# Patient Record
Sex: FEMALE | Race: WHITE | NOT HISPANIC OR LATINO | Employment: OTHER | URBAN - METROPOLITAN AREA
[De-identification: names, ages, dates, MRNs, and addresses within clinical notes are randomized per-mention and may not be internally consistent; named-entity substitution may affect disease eponyms.]

---

## 2018-07-12 ENCOUNTER — HOSPITAL ENCOUNTER (OUTPATIENT)
Dept: MAMMOGRAPHY | Facility: CLINIC | Age: 68
Discharge: HOME | End: 2018-07-12
Attending: FAMILY MEDICINE | Admitting: FAMILY MEDICINE
Payer: MEDICARE

## 2018-07-12 DIAGNOSIS — Z12.31 VISIT FOR SCREENING MAMMOGRAM: ICD-10-CM

## 2018-07-12 PROCEDURE — 77063 BREAST TOMOSYNTHESIS BI: CPT

## 2019-08-23 ENCOUNTER — HOSPITAL ENCOUNTER (OUTPATIENT)
Dept: MAMMOGRAPHY | Facility: CLINIC | Age: 69
Discharge: HOME | End: 2019-08-23
Attending: FAMILY MEDICINE | Admitting: FAMILY MEDICINE
Payer: MEDICARE

## 2019-08-23 ENCOUNTER — HOSPITAL ENCOUNTER (OUTPATIENT)
Dept: MAMMOGRAPHY | Facility: CLINIC | Age: 69
End: 2019-08-23
Attending: FAMILY MEDICINE
Payer: MEDICARE

## 2019-08-23 DIAGNOSIS — N63.21 LUMP IN UPPER OUTER QUADRANT OF LEFT BREAST: ICD-10-CM

## 2019-08-23 DIAGNOSIS — N63.20 LEFT BREAST LUMP: ICD-10-CM

## 2019-08-23 PROCEDURE — 76642 ULTRASOUND BREAST LIMITED: CPT | Mod: LT

## 2019-08-23 PROCEDURE — G0279 TOMOSYNTHESIS, MAMMO: HCPCS

## 2019-08-23 PROCEDURE — 77066 DX MAMMO INCL CAD BI: CPT

## 2020-09-10 ENCOUNTER — HOSPITAL ENCOUNTER (OUTPATIENT)
Dept: MAMMOGRAPHY | Facility: CLINIC | Age: 70
Discharge: HOME | End: 2020-09-10
Attending: FAMILY MEDICINE | Admitting: FAMILY MEDICINE
Payer: MEDICARE

## 2020-09-10 DIAGNOSIS — Z12.31 VISIT FOR SCREENING MAMMOGRAM: ICD-10-CM

## 2020-09-10 PROCEDURE — 77067 SCR MAMMO BI INCL CAD: CPT

## 2021-09-28 ENCOUNTER — HOSPITAL ENCOUNTER (OUTPATIENT)
Dept: MAMMOGRAPHY | Facility: CLINIC | Age: 71
Discharge: HOME | End: 2021-09-28
Attending: FAMILY MEDICINE | Admitting: FAMILY MEDICINE
Payer: MEDICARE

## 2021-09-28 DIAGNOSIS — Z12.31 VISIT FOR SCREENING MAMMOGRAM: ICD-10-CM

## 2021-09-28 PROCEDURE — 77063 BREAST TOMOSYNTHESIS BI: CPT

## 2021-10-31 ENCOUNTER — HEALTH MAINTENANCE LETTER (OUTPATIENT)
Age: 71
End: 2021-10-31

## 2022-10-13 ENCOUNTER — HOSPITAL ENCOUNTER (OUTPATIENT)
Dept: MAMMOGRAPHY | Facility: CLINIC | Age: 72
Discharge: HOME | End: 2022-10-13
Attending: FAMILY MEDICINE | Admitting: FAMILY MEDICINE
Payer: MEDICARE

## 2022-10-13 DIAGNOSIS — Z12.31 VISIT FOR SCREENING MAMMOGRAM: ICD-10-CM

## 2022-10-13 PROCEDURE — 77067 SCR MAMMO BI INCL CAD: CPT

## 2022-10-23 ENCOUNTER — HEALTH MAINTENANCE LETTER (OUTPATIENT)
Age: 72
End: 2022-10-23

## 2022-11-15 ENCOUNTER — ANCILLARY PROCEDURE (OUTPATIENT)
Dept: MRI IMAGING | Facility: CLINIC | Age: 72
End: 2022-11-15
Attending: FAMILY MEDICINE
Payer: MEDICARE

## 2022-11-15 DIAGNOSIS — K76.9 LIVER LESION: ICD-10-CM

## 2022-11-15 PROCEDURE — 255N000002 HC RX 255 OP 636: Performed by: FAMILY MEDICINE

## 2022-11-15 PROCEDURE — 74183 MRI ABD W/O CNTR FLWD CNTR: CPT

## 2022-11-15 PROCEDURE — A9585 GADOBUTROL INJECTION: HCPCS | Performed by: FAMILY MEDICINE

## 2022-11-15 RX ORDER — GADOBUTROL 604.72 MG/ML
7 INJECTION INTRAVENOUS ONCE
Status: COMPLETED | OUTPATIENT
Start: 2022-11-15 | End: 2022-11-15

## 2022-11-15 RX ADMIN — GADOBUTROL 7 ML: 604.72 INJECTION INTRAVENOUS at 11:15

## 2023-01-25 ENCOUNTER — ANCILLARY PROCEDURE (OUTPATIENT)
Dept: ULTRASOUND IMAGING | Facility: CLINIC | Age: 73
End: 2023-01-25
Attending: FAMILY MEDICINE
Payer: MEDICARE

## 2023-01-25 DIAGNOSIS — N94.89 ADNEXAL MASS: ICD-10-CM

## 2023-01-25 PROCEDURE — 76830 TRANSVAGINAL US NON-OB: CPT

## 2023-02-22 ENCOUNTER — HOSPITAL ENCOUNTER (EMERGENCY)
Facility: CLINIC | Age: 73
Discharge: HOME | End: 2023-02-22
Attending: EMERGENCY MEDICINE | Admitting: EMERGENCY MEDICINE
Payer: MEDICARE

## 2023-02-22 ENCOUNTER — APPOINTMENT (OUTPATIENT)
Dept: CT IMAGING | Facility: CLINIC | Age: 73
End: 2023-02-22
Payer: MEDICARE

## 2023-02-22 VITALS
BODY MASS INDEX: 26.58 KG/M2 | HEART RATE: 80 BPM | RESPIRATION RATE: 14 BRPM | OXYGEN SATURATION: 97 % | DIASTOLIC BLOOD PRESSURE: 78 MMHG | HEIGHT: 63 IN | TEMPERATURE: 98.9 F | SYSTOLIC BLOOD PRESSURE: 142 MMHG | WEIGHT: 150 LBS

## 2023-02-22 DIAGNOSIS — R10.9 FLANK PAIN: ICD-10-CM

## 2023-02-22 DIAGNOSIS — N20.1 URETERAL STONE: ICD-10-CM

## 2023-02-22 DIAGNOSIS — D36.9 DERMOID CYST: ICD-10-CM

## 2023-02-22 LAB
ALBUMIN UR-MCNC: 50 MG/DL
ANION GAP SERPL CALCULATED.3IONS-SCNC: 11 MMOL/L (ref 7–15)
APPEARANCE UR: ABNORMAL
BILIRUB UR QL STRIP: NEGATIVE
BUN SERPL-MCNC: 23.2 MG/DL (ref 8–23)
CALCIUM SERPL-MCNC: 10.1 MG/DL (ref 8.8–10.2)
CHLORIDE SERPL-SCNC: 102 MMOL/L (ref 98–107)
COLOR UR AUTO: YELLOW
CREAT SERPL-MCNC: 0.94 MG/DL (ref 0.51–0.95)
DEPRECATED HCO3 PLAS-SCNC: 24 MMOL/L (ref 22–29)
ERYTHROCYTE [DISTWIDTH] IN BLOOD BY AUTOMATED COUNT: 12.5 % (ref 10–15)
GFR SERPL CREATININE-BSD FRML MDRD: 65 ML/MIN/1.73M2
GLUCOSE SERPL-MCNC: 159 MG/DL (ref 70–99)
GLUCOSE UR STRIP-MCNC: 30 MG/DL
HCT VFR BLD AUTO: 41.2 % (ref 35–47)
HGB BLD-MCNC: 13.8 G/DL (ref 11.7–15.7)
HGB UR QL STRIP: ABNORMAL
KETONES UR STRIP-MCNC: ABNORMAL MG/DL
LEUKOCYTE ESTERASE UR QL STRIP: NEGATIVE
MCH RBC QN AUTO: 30.7 PG (ref 26.5–33)
MCHC RBC AUTO-ENTMCNC: 33.5 G/DL (ref 31.5–36.5)
MCV RBC AUTO: 92 FL (ref 78–100)
MUCOUS THREADS #/AREA URNS LPF: PRESENT /LPF
NITRATE UR QL: NEGATIVE
PH UR STRIP: 5.5 [PH] (ref 5–7)
PLATELET # BLD AUTO: 384 10E3/UL (ref 150–450)
POTASSIUM SERPL-SCNC: 4.8 MMOL/L (ref 3.4–5.3)
RADIOLOGIST FLAGS: ABNORMAL
RBC # BLD AUTO: 4.49 10E6/UL (ref 3.8–5.2)
RBC URINE: >182 /HPF
SODIUM SERPL-SCNC: 137 MMOL/L (ref 136–145)
SP GR UR STRIP: 1.03 (ref 1–1.03)
UROBILINOGEN UR STRIP-MCNC: NORMAL MG/DL
WBC # BLD AUTO: 12.7 10E3/UL (ref 4–11)
WBC URINE: 26 /HPF

## 2023-02-22 PROCEDURE — 81001 URINALYSIS AUTO W/SCOPE: CPT | Performed by: EMERGENCY MEDICINE

## 2023-02-22 PROCEDURE — 36415 COLL VENOUS BLD VENIPUNCTURE: CPT | Performed by: EMERGENCY MEDICINE

## 2023-02-22 PROCEDURE — 85027 COMPLETE CBC AUTOMATED: CPT | Performed by: EMERGENCY MEDICINE

## 2023-02-22 PROCEDURE — 87086 URINE CULTURE/COLONY COUNT: CPT | Performed by: EMERGENCY MEDICINE

## 2023-02-22 PROCEDURE — 74176 CT ABD & PELVIS W/O CONTRAST: CPT | Mod: MG

## 2023-02-22 PROCEDURE — 99284 EMERGENCY DEPT VISIT MOD MDM: CPT | Mod: 25

## 2023-02-22 PROCEDURE — 82947 ASSAY GLUCOSE BLOOD QUANT: CPT | Performed by: EMERGENCY MEDICINE

## 2023-02-22 RX ORDER — TAMSULOSIN HYDROCHLORIDE 0.4 MG/1
0.4 CAPSULE ORAL DAILY
Qty: 7 CAPSULE | Refills: 0 | Status: SHIPPED | OUTPATIENT
Start: 2023-02-22 | End: 2023-03-01

## 2023-02-22 RX ORDER — OXYCODONE HYDROCHLORIDE 5 MG/1
2.5-5 TABLET ORAL EVERY 6 HOURS PRN
Qty: 8 TABLET | Refills: 0 | Status: SHIPPED | OUTPATIENT
Start: 2023-02-22 | End: 2023-02-25

## 2023-02-22 RX ORDER — ONDANSETRON 4 MG/1
4 TABLET, ORALLY DISINTEGRATING ORAL EVERY 8 HOURS PRN
Qty: 10 TABLET | Refills: 0 | Status: SHIPPED | OUTPATIENT
Start: 2023-02-22

## 2023-02-22 ASSESSMENT — ACTIVITIES OF DAILY LIVING (ADL): ADLS_ACUITY_SCORE: 35

## 2023-02-22 NOTE — ED TRIAGE NOTES
Patient reports history of kidney stones. She states this morning she developed right sided flank pain that has been episodic in nature. Ibuprofen is helping her pain. She reports taking 600mg at 7am and 200mg at 10am.

## 2023-02-22 NOTE — ED PROVIDER NOTES
"  History     Chief Complaint:  Flank Pain       HPI   Schuyler Carl is a 72 year old female with a history of hypertension, hyperlipidemia, and nephrolithiasis who presents with right flank pain. Schuyler states that she has a known right kidney stone as of September 2022. Yesterday night, she started to experience some hematuria, then at 0500 she woke up with some right flank pain. The pain became sharp, so she took Advil and was able to manage it for awhile, but did take a second Advil before presenting to the ED. During evaluation, Schuyler states that her pain is more of a dull ache. She did have some associated nausea, but this has resolved as her pain has gone down. Schuyler otherwise denies any fever, diarrhea, emesis, dysuria, or rash.       Independent Historian:   None - Patient Only    Review of External Notes: I reviewed the patient's imaging results from 9/2022 showing a right kidney stone while in the room with the patient.      ROS:  Review of Systems   All other systems reviewed and are negative.      Allergies:  Lisinopril     Medications:    Atorvastatin  Losartan  Zofran  Lisinopril    Past Medical History:    Nephrolithiasis  Hypertension  Hyperlipidemia  Thyroid cyst    Past Surgical History:    Tonsillectomy     Social History:  Patient is accompanied in the ED by her spouse.  PCP: Madison Perez     Physical Exam     Patient Vitals for the past 24 hrs:   BP Temp Temp src Pulse Resp SpO2 Height Weight   02/22/23 1121 -- -- -- -- -- -- 1.6 m (5' 3\") 68 kg (150 lb)   02/22/23 1112 (!) 163/100 98.9  F (37.2  C) Temporal 90 14 96 % -- --        Physical Exam  General: Resting on the bed.  Head: No obvious trauma to head.  Ears, Nose, Throat:  External ears normal.  Nose normal.   Eyes:  Conjunctivae clear.   CV: Regular rate and rhythm.  No murmurs.      Respiratory: Effort normal and breath sounds normal.  No wheezing or crackles.   Gastrointestinal: Soft.  No distension. There is mild RLQ " tenderness.  There is no rigidity, no rebound and no guarding.   Musculoskeletal: No cva tenderness   Neuro: Alert. Moving all extremities appropriately.  Normal speech.    Skin: Skin is warm and dry.  No rash noted.     Emergency Department Course     Imaging:  Abd/pelvis CT no contrast - Stone Protocol   Final Result   Abnormal   IMPRESSION:    1.  Obstructing 5 mm stone in the proximal right ureter with moderate   proximal hydroureteronephrosis.   2.  Multiple nonobstructing left renal calculi.   3.  Grossly stable size of likely right ovarian dermoid. Recommend   follow-up pelvic ultrasound in 6 months if not removed.   4.  Stable size of 2.9 cm right adrenal adenoma. Consider biochemical   workup and follow-up noncontrast MRI in one year to document size   stability.      [Access Center: Obstructing ureteral stone]      This report will be copied to the [facility] to ensure a   provider acknowledges the finding. Access Center is available Monday   through Friday 8am-3:30 pm.       SLAVA FONTANEZ MD            SYSTEM ID:  [id number]         Report per radiology    Laboratory:  Labs Ordered and Resulted from Time of ED Arrival to Time of ED Departure   CBC WITH PLATELETS - Abnormal       Result Value    WBC Count 12.7 (*)     RBC Count 4.49      Hemoglobin 13.8      Hematocrit 41.2      MCV 92      MCH 30.7      MCHC 33.5      RDW 12.5      Platelet Count 384     BASIC METABOLIC PANEL - Abnormal    Sodium 137      Potassium 4.8      Chloride 102      Carbon Dioxide (CO2) 24      Anion Gap 11      Urea Nitrogen 23.2 (*)     Creatinine 0.94      Calcium 10.1      Glucose 159 (*)     GFR Estimate 65     ROUTINE UA WITH MICROSCOPIC REFLEX TO CULTURE - Abnormal    Color Urine Yellow      Appearance Urine Slightly Cloudy (*)     Glucose Urine 30 (*)     Bilirubin Urine Negative      Ketones Urine Trace (*)     Specific Gravity Urine 1.029      Blood Urine Large (*)     pH Urine 5.5      Protein Albumin  Urine 50 (*)     Urobilinogen Urine Normal      Nitrite Urine Negative      Leukocyte Esterase Urine Negative      Mucus Urine Present (*)     RBC Urine >182 (*)     WBC Urine 26 (*)    URINE CULTURE        Procedures   none    Emergency Department Course & Assessments:       Interventions:  Medications - No data to display     Independent Interpretation (X-rays, CTs, rhythm strip):  I independently reviewed the patient's CT.     Consultations/Discussion of Management or Tests:  none        Social Determinants of Health affecting care:   None    Assessments:   I obtained history and examined the patient as noted above. At this time, the patient was deemed safe to discharge home and she agreed to the plan of care.    Disposition:  The patient was discharged to home.     Impression & Plan    CMS Diagnoses: None      Medical Decision Makin-year-old female presents with right-sided abdominal pain.  Vital signs are reassuring.  Broad differential was pursued including not limited to pyelonephritis, nephrolithiasis, colitis, diverticulitis, obstructing stone, infected stone, obstruction, perforation, etc.  CBC shows mild leukocytosis but no anemia.  Suspect leukocytosis is related to pain as patient's afebrile has no other infectious signs or symptoms.  BMP without acute electrolyte, metabolic or renal dysfunction.  UA with blood but no evidence of infection.  Culture pending.  CT scan confirms a right ureteral stone.  Patient's pain is controlled without intervention.  She has no clinical signs to indicate infected stone.  Discussed remainder of incidental findings on CT scan and patient is already aware and following with her primary doctor regarding these.  Discussed pain control at home and symptom management.  Discussed follow-up with urology.  Return precautions were discussed and patient was at this point appropriate for discharge home.      Diagnosis:    ICD-10-CM    1. Ureteral stone  N20.1 Adult  Urology  Referral      2. 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 Flank pain  R10.9            Discharge Medications:  New Prescriptions    ONDANSETRON (ZOFRAN ODT) 4 MG ODT TAB    Take 1 tablet (4 mg) by mouth every 8 hours as needed for nausea    OXYCODONE (ROXICODONE) 5 MG TABLET    Take 0.5-1 tablets (2.5-5 mg) by mouth every 6 hours as needed for severe pain (7-10)    TAMSULOSIN (FLOMAX) 0.4 MG CAPSULE    Take 1 capsule (0.4 mg) by mouth daily for 7 days        Scribe Disclosure:  I, Kavita Wen, am serving as a scribe at 5:21 PM on 2/22/2023 to document services personally performed by Brenda Cuellar MD based on my observations and the provider's statements to me.     2/22/2023   Brenda Cuellar MD Bennett, Jennifer L, MD  02/22/23 2735

## 2023-02-24 LAB — BACTERIA UR CULT: NORMAL

## 2023-02-27 ENCOUNTER — VIRTUAL VISIT (OUTPATIENT)
Dept: UROLOGY | Facility: CLINIC | Age: 73
End: 2023-02-27
Payer: MEDICARE

## 2023-02-27 VITALS — HEIGHT: 63 IN | WEIGHT: 150 LBS | BODY MASS INDEX: 26.58 KG/M2

## 2023-02-27 DIAGNOSIS — N20.0 NEPHROLITHIASIS: ICD-10-CM

## 2023-02-27 DIAGNOSIS — N20.1 URETERAL STONE: Primary | ICD-10-CM

## 2023-02-27 PROCEDURE — 99203 OFFICE O/P NEW LOW 30 MIN: CPT | Mod: VID | Performed by: PHYSICIAN ASSISTANT

## 2023-02-27 RX ORDER — LOSARTAN POTASSIUM 100 MG/1
1 TABLET ORAL DAILY
COMMUNITY
Start: 2021-08-25

## 2023-02-27 RX ORDER — ATORVASTATIN CALCIUM 10 MG/1
1 TABLET, FILM COATED ORAL AT BEDTIME
COMMUNITY
Start: 2021-11-16

## 2023-02-27 ASSESSMENT — ENCOUNTER SYMPTOMS
FEVER: 0
SHORTNESS OF BREATH: 0
HEMATURIA: 1
FLANK PAIN: 1
CHILLS: 0
VOMITING: 0
NAUSEA: 1

## 2023-02-27 ASSESSMENT — PAIN SCALES - GENERAL: PAINLEVEL: NO PAIN (0)

## 2023-02-27 NOTE — LETTER
2/27/2023       RE: Schuyler Carl  [address]  [facility] [address]     Dear Colleague,    Thank you for referring your patient, Schuyler Carl, to the [facility] [place] [facility]. Please see a copy of my visit note below.    Send link to cell phone    Schuyler is a 72 year old who is being evaluated via a billable video visit.      How would you like to obtain your AVS? MyChart  If the video visit is dropped, the invitation should be resent by: Text to cell phone: [phone]  Will anyone else be joining your video visit? No      Video-Visit Details    Type of service:  Video Visit   Video Start Time: 1400  Video End Time:1418    Originating Location (pt. Location): Home    Distant Location (provider location):  On-site  Platform used for Video Visit: Ramiro Trejo       REQUESTING PROVIDER   Brenda Cuellar     REASON FOR CONSULT   Ureteral stone  Gross hematuria    HISTORY OF PRESENT ILLNESS   Ms. Carl is a pleasant 72-year-old female, who presents today for further evaluation recommendations regarding a recently detected right ureteral stone.  Patient was seen in the emergency department on 02/22/2023 with right flank pain and gross hematuria.  She underwent CT imaging which showed a 5 mm stone in the mid ureter with hydronephrosis.  Patient also endorsed nausea.  She also has several stones within the left kidney.    She did know that she knew about her stones.  She had undergone imaging this summer when she had an episode of painless gross hematuria.  At that time, she was told that she had bilateral stones.  Urine culture at that time was negative.  She has not had recurrent gross hematuria until this episode of nephrolithiasis.    She has not been straining her urine.  She is uncertain if she has passed the stone yet.  She is not having any sharp pain.  She does note some mild achiness on her right side.  She is only  needing Advil for discomfort.    Patient currently denies nausea, vomiting, fevers, chills, shortness of breath, or chest pain.  She has never smoked.  Denies any jobs with significant chemical exposure.  No personal history or family history of nephrolithiasis.      Patient was also noted to have an ovarian mass as well as an adrenal nodule.  Patient has had previous evaluation and is already being followed for these.    The following portions of the patient's history were reviewed and updated as appropriate: allergies, current medications, past family history, past medical history, past social history, past surgical history and problem list.     REVIEW OF SYSTEMS   Review of Systems   Constitutional: Negative for chills and fever.   Respiratory: Negative for shortness of breath.    Cardiovascular: Negative for chest pain.   Gastrointestinal: Positive for nausea (Resolved.). Negative for vomiting.   Genitourinary: Positive for flank pain and hematuria.      Per HPI.     Allergies:  Lisinopril      Medications:    Current Outpatient Medications   Medication     atorvastatin (LIPITOR) 10 MG tablet     losartan (COZAAR) 100 MG tablet     VITAMIN D PO     ondansetron (ZOFRAN ODT) 4 MG ODT tab     tamsulosin (FLOMAX) 0.4 MG capsule     No current facility-administered medications for this visit.     Past Medical History:    Hypertension  Hyperlipidemia  Thyroid cyst     Past Surgical History:    Tonsillectomy      Social History:  .    Family History:   No family history of  malignancy or nephrolithiasis.    Objective       PHYSICAL EXAM   GENERAL: Healthy, alert and no distress  EYES: Eyes grossly normal to inspection.  No discharge or erythema, or obvious scleral/conjunctival abnormalities.  HENT: Normal cephalic/atraumatic.  External ears, nose and mouth without ulcers or lesions.  No nasal drainage visible.  NECK: No asymmetry, visible masses or scars  RESP: No audible wheeze, cough, or visible cyanosis.   No visible retractions or increased work of breathing.    MS: No gross musculoskeletal defects noted.  Normal range of motion.  No visible edema.  SKIN: Visible skin clear. No significant rash, abnormal pigmentation or lesions.  NEURO: Cranial nerves grossly intact.  Mentation and speech appropriate for age.  PSYCH: Mentation appears normal, affect normal/bright, judgement and insight intact, normal speech and appearance well-groomed.     LABORATORY   Lab Results   Component Value Date    CR 0.94 02/22/2023      Recent Labs   Lab Test 02/22/23  1121   COLOR Yellow   APPEARANCE Slightly Cloudy*   URINEGLC 30*   URINEBILI Negative   URINEKETONE Trace*   SG 1.029   UBLD Large*   URINEPH 5.5   PROTEIN 50*   NITRITE Negative   LEUKEST Negative   RBCU >182*   WBCU 26*     WBC: 12.7    IMAGING     I personally reviewed the images.     Abd/pelvis CT no contrast - Stone Protocol    Result Date: 2/22/2023  CT ABDOMEN PELVIS W/O CONTRAST 2/22/2023 4:16 PM CLINICAL HISTORY: Hx kidney stones, blood in urine, flank pain TECHNIQUE: CT scan of the abdomen and pelvis was performed without IV contrast. Multiplanar reformats were obtained. Dose reduction techniques were used. CONTRAST: None. COMPARISON: Ultrasound 1/25/2023, MRI 11/15/2022. FINDINGS: LOWER CHEST: Unremarkable. HEPATOBILIARY: Normal. PANCREAS: Normal. SPLEEN: Normal. ADRENAL GLANDS: Stable size of 2.9 cm right adrenal nodule which measures less than 10 Hounsfield units, compatible with lipid rich adenoma. Left adrenal gland is unremarkable. KIDNEYS/BLADDER: Obstructing 5 mm stone in the proximal right ureter just distal to the ureteropelvic junction with moderate proximal hydronephrosis. Multiple nonobstructing left renal calculi, largest measuring 4 mm in the interpolar region. No left ureterolithiasis or hydronephrosis. Urinary bladder is unremarkable. BOWEL: Diverticulosis in the colon. No acute inflammatory change. No obstruction. LYMPH NODES: Normal. VASCULATURE:  Scattered calcified atherosclerosis. PELVIC ORGANS: Stable size of right adnexal mass with internal macroscopic fat and calcifications measuring up to 6.5 cm (series 2 image 146, series 4 image 63). Uterus and left adnexa are unremarkable. OTHER: None. MUSCULOSKELETAL: No acute bony abnormality.     IMPRESSION: 1.  Obstructing 5 mm stone in the proximal right ureter with moderate proximal hydroureteronephrosis. 2.  Multiple nonobstructing left renal calculi. 3.  Grossly stable size of likely right ovarian dermoid. Recommend follow-up pelvic ultrasound in 6 months if not removed. 4.  Stable size of 2.9 cm right adrenal adenoma. Consider biochemical workup and follow-up noncontrast MRI in one year to document size stability. [Access Center: Obstructing ureteral stone] This report will be copied to the [facility] to ensure a provider acknowledges the finding. Access Center is available Monday through Friday 8am-3:30 pm. SLAVA FONTANEZ MD   SYSTEM ID:  [id number]     Assessment & Plan    1. Ureteral stone    2. Nephrolithiasis       I had the pleasure today of meeting with Ms. Carl to discuss her recently detected right ureteral stone.  Patient is no longer having nausea.  She has not been straining her urine.  She has only some mild discomfort.  We discussed that we would either want imaging to confirm that the stone has passed or for her to actually catch the stone.     Symptoms currently controlled. Given size and location of stone, and fact that symptoms are well controlled, it is reasonable to continue with trial of medical expulsive therapy at this time.   - Continue tamsulosin 0.4 mg daily-has not been taking  - Continue to push fluids. Strain all urine and submit any captured stones for analysis.  - Advil and Tylenol for pain.  - Follow up in 2-3 weeks. If the stone passes prior to f/u appointment, pt will bring in for stone analysis.  - Warning signs discussed including fevers, chills, gross  hematuria, severe pain that is refractory to medications or uncontrolled nausea and vomiting, which should prompt more urgent evaluation. Patient understands to proceed to the ER should these warning signs occur outside of clinic hours.    We also discussed that with gross hematuria, would recommend cystoscopy for evaluation as she had painless gross hematuria this summer without evidence of infection or stone.    If stone has passed, patient would likely need cystoscopy, right ureteroscopy with laser lithotripsy and basketing, and right ureteral stent placement.  Cystoscopy can be completed at that time.  If stone has passed, will need office cystoscopy with Dr. Feliz.     We discussed possible side effects with an indwelling ureteral stent such as urgency and frequency of urination, dysuria, hematuria, symptoms of urine reflux, and some achiness in the side. Indwelling ureteral stents need to be exchanged every three months or removed by three months.     During counseling for this visit, we covered the natural history of kidney stones, the risk of progression to symptomatic pain/infection, and the possibility of renal failure/kidney damage.  We covered treatment options including medical expulsive therapy, ureteroscopy/laser/stent, extracorporeal shock wave lithotripsy (ESWL), and percutaneous nephrolithotomy (PCNL).      Standard recommendations on kidney stone prevention were provided.    Signed by:     Marline Huber PA-C 2/27/2023 2:01 PM

## 2023-02-27 NOTE — PATIENT INSTRUCTIONS
- Continue tamsulosin 0.4 mg daily-has not been taking  - Continue to push fluids. Strain all urine and submit any captured stones for analysis.  - Advil and Tylenol for pain.  - Follow up in 2-3 weeks with CT ab/pelvis w/o. If the stone passes prior to f/u appointment, pt will bring in for stone analysis.  - Warning signs discussed including fevers, chills, gross hematuria, severe pain that is refractory to medications or uncontrolled nausea and vomiting, which should prompt more urgent evaluation. Patient understands to proceed to the ER should these warning signs occur outside of clinic hours.    If stone has passed, patient would likely need cystoscopy, right ureteroscopy with laser lithotripsy and basketing, and right ureteral stent placement.  Cystoscopy can be completed at that time.      If stone has passed, will need office cystoscopy with Dr. Feliz.     Standard recommendations on kidney stone prevention:  -These include maintaining fluid intake of 3 liters per day or more with a goal of making 2.5 or more liters of urine per day.  If alcoholic or caffeinated beverages are consumed, you need to drink water along with these beverages to maintain hydration.    -A few ounces of lemon juice concentrate a day diluted in water can help prevent stones (citrate is a stone inhibitor).  Can also try Crystal Light.  -Sodium influences calcium excretion in the urine.  Try to limit sodium to 9952-3355 mg/day.   Limit intake of red meat, salt, and salty processed foods.  Limit animal protein to 0.8-1.3 g/kg/day.    -Uric acid-high levels in the blood can lead to kidney stones and gout, especially if the urine pH is low.  Reduce her protein intake, especially red meats.  -Weight loss, if overweight, can reduce the recurrence of kidney stones.  -Diabetes-if diabetic, you are at a greater risk of having kidney stones.  -Maintain calcium intake through DIETARY source with continued consumption of dairy products etc.  Normal  intake of calcium is 800-1200 mg/day.  -Limit foods that are high in oxalate such as spinach, sweet potatoes, dark chocolate, soy products, and some nuts such as peanuts.   -Medications that can increase risk of kidney stones: Ephedrine, Guaifenesin, Triamterene, Lasix, Diamox, Topamax, Zonegran, laxatives.

## 2023-02-27 NOTE — PROGRESS NOTES
Send link to cell phone    Schuyler is a 72 year old who is being evaluated via a billable video visit.      How would you like to obtain your AVS? MyChart  If the video visit is dropped, the invitation should be resent by: Text to cell phone: [phone]  Will anyone else be joining your video visit? No      Video-Visit Details    Type of service:  Video Visit   Video Start Time: 1400  Video End Time:1418    Originating Location (pt. Location): Home    Distant Location (provider location):  On-site  Platform used for Video Visit: Ramiro Trejo      REQUESTING PROVIDER   Brenda Cuellar     REASON FOR CONSULT   Ureteral stone  Gross hematuria    HISTORY OF PRESENT ILLNESS   Ms. Carl is a pleasant 72-year-old female, who presents today for further evaluation recommendations regarding a recently detected right ureteral stone.  Patient was seen in the emergency department on 02/22/2023 with right flank pain and gross hematuria.  She underwent CT imaging which showed a 5 mm stone in the mid ureter with hydronephrosis.  Patient also endorsed nausea.  She also has several stones within the left kidney.    She did know that she knew about her stones.  She had undergone imaging this summer when she had an episode of painless gross hematuria.  At that time, she was told that she had bilateral stones.  Urine culture at that time was negative.  She has not had recurrent gross hematuria until this episode of nephrolithiasis.    She has not been straining her urine.  She is uncertain if she has passed the stone yet.  She is not having any sharp pain.  She does note some mild achiness on her right side.  She is only needing Advil for discomfort.    Patient currently denies nausea, vomiting, fevers, chills, shortness of breath, or chest pain.  She has never smoked.  Denies any jobs with significant chemical exposure.  No personal history or family history of nephrolithiasis.      Patient was also noted to have an ovarian  mass as well as an adrenal nodule.  Patient has had previous evaluation and is already being followed for these.    The following portions of the patient's history were reviewed and updated as appropriate: allergies, current medications, past family history, past medical history, past social history, past surgical history and problem list.     REVIEW OF SYSTEMS   Review of Systems   Constitutional: Negative for chills and fever.   Respiratory: Negative for shortness of breath.    Cardiovascular: Negative for chest pain.   Gastrointestinal: Positive for nausea (Resolved.). Negative for vomiting.   Genitourinary: Positive for flank pain and hematuria.      Per HPI.     Allergies:  Lisinopril      Medications:    Current Outpatient Medications   Medication     atorvastatin (LIPITOR) 10 MG tablet     losartan (COZAAR) 100 MG tablet     VITAMIN D PO     ondansetron (ZOFRAN ODT) 4 MG ODT tab     tamsulosin (FLOMAX) 0.4 MG capsule     No current facility-administered medications for this visit.     Past Medical History:    Hypertension  Hyperlipidemia  Thyroid cyst     Past Surgical History:    Tonsillectomy      Social History:  .    Family History:   No family history of  malignancy or nephrolithiasis.    Objective      PHYSICAL EXAM   GENERAL: Healthy, alert and no distress  EYES: Eyes grossly normal to inspection.  No discharge or erythema, or obvious scleral/conjunctival abnormalities.  HENT: Normal cephalic/atraumatic.  External ears, nose and mouth without ulcers or lesions.  No nasal drainage visible.  NECK: No asymmetry, visible masses or scars  RESP: No audible wheeze, cough, or visible cyanosis.  No visible retractions or increased work of breathing.    MS: No gross musculoskeletal defects noted.  Normal range of motion.  No visible edema.  SKIN: Visible skin clear. No significant rash, abnormal pigmentation or lesions.  NEURO: Cranial nerves grossly intact.  Mentation and speech appropriate for  age.  PSYCH: Mentation appears normal, affect normal/bright, judgement and insight intact, normal speech and appearance well-groomed.     LABORATORY   Lab Results   Component Value Date    CR 0.94 02/22/2023      Recent Labs   Lab Test 02/22/23  1121   COLOR Yellow   APPEARANCE Slightly Cloudy*   URINEGLC 30*   URINEBILI Negative   URINEKETONE Trace*   SG 1.029   UBLD Large*   URINEPH 5.5   PROTEIN 50*   NITRITE Negative   LEUKEST Negative   RBCU >182*   WBCU 26*     WBC: 12.7    IMAGING     I personally reviewed the images.     Abd/pelvis CT no contrast - Stone Protocol    Result Date: 2/22/2023  CT ABDOMEN PELVIS W/O CONTRAST 2/22/2023 4:16 PM CLINICAL HISTORY: Hx kidney stones, blood in urine, flank pain TECHNIQUE: CT scan of the abdomen and pelvis was performed without IV contrast. Multiplanar reformats were obtained. Dose reduction techniques were used. CONTRAST: None. COMPARISON: Ultrasound 1/25/2023, MRI 11/15/2022. FINDINGS: LOWER CHEST: Unremarkable. HEPATOBILIARY: Normal. PANCREAS: Normal. SPLEEN: Normal. ADRENAL GLANDS: Stable size of 2.9 cm right adrenal nodule which measures less than 10 Hounsfield units, compatible with lipid rich adenoma. Left adrenal gland is unremarkable. KIDNEYS/BLADDER: Obstructing 5 mm stone in the proximal right ureter just distal to the ureteropelvic junction with moderate proximal hydronephrosis. Multiple nonobstructing left renal calculi, largest measuring 4 mm in the interpolar region. No left ureterolithiasis or hydronephrosis. Urinary bladder is unremarkable. BOWEL: Diverticulosis in the colon. No acute inflammatory change. No obstruction. LYMPH NODES: Normal. VASCULATURE: Scattered calcified atherosclerosis. PELVIC ORGANS: Stable size of right adnexal mass with internal macroscopic fat and calcifications measuring up to 6.5 cm (series 2 image 146, series 4 image 63). Uterus and left adnexa are unremarkable. OTHER: None. MUSCULOSKELETAL: No acute bony abnormality.      IMPRESSION: 1.  Obstructing 5 mm stone in the proximal right ureter with moderate proximal hydroureteronephrosis. 2.  Multiple nonobstructing left renal calculi. 3.  Grossly stable size of likely right ovarian dermoid. Recommend follow-up pelvic ultrasound in 6 months if not removed. 4.  Stable size of 2.9 cm right adrenal adenoma. Consider biochemical workup and follow-up noncontrast MRI in one year to document size stability. [Access Center: Obstructing ureteral stone] This report will be copied to the [facility] to ensure a provider acknowledges the finding. Access Center is available Monday through Friday 8am-3:30 pm. SLAVA FONTANEZ MD   SYSTEM ID:  [id number]     Assessment & Plan    1. Ureteral stone    2. Nephrolithiasis       I had the pleasure today of meeting with Ms. Carl to discuss her recently detected right ureteral stone.  Patient is no longer having nausea.  She has not been straining her urine.  She has only some mild discomfort.  We discussed that we would either want imaging to confirm that the stone has passed or for her to actually catch the stone.     Symptoms currently controlled. Given size and location of stone, and fact that symptoms are well controlled, it is reasonable to continue with trial of medical expulsive therapy at this time.   - Continue tamsulosin 0.4 mg daily-has not been taking  - Continue to push fluids. Strain all urine and submit any captured stones for analysis.  - Advil and Tylenol for pain.  - Follow up in 2-3 weeks. If the stone passes prior to f/u appointment, pt will bring in for stone analysis.  - Warning signs discussed including fevers, chills, gross hematuria, severe pain that is refractory to medications or uncontrolled nausea and vomiting, which should prompt more urgent evaluation. Patient understands to proceed to the ER should these warning signs occur outside of clinic hours.    We also discussed that with gross hematuria, would recommend  cystoscopy for evaluation as she had painless gross hematuria this summer without evidence of infection or stone.    If stone has passed, patient would likely need cystoscopy, right ureteroscopy with laser lithotripsy and basketing, and right ureteral stent placement.  Cystoscopy can be completed at that time.  If stone has passed, will need office cystoscopy with Dr. Feliz.     We discussed possible side effects with an indwelling ureteral stent such as urgency and frequency of urination, dysuria, hematuria, symptoms of urine reflux, and some achiness in the side. Indwelling ureteral stents need to be exchanged every three months or removed by three months.     During counseling for this visit, we covered the natural history of kidney stones, the risk of progression to symptomatic pain/infection, and the possibility of renal failure/kidney damage.  We covered treatment options including medical expulsive therapy, ureteroscopy/laser/stent, extracorporeal shock wave lithotripsy (ESWL), and percutaneous nephrolithotomy (PCNL).      Standard recommendations on kidney stone prevention were provided.    Signed by:     Marline Huber PA-C 2/27/2023 2:01 PM

## 2023-03-23 ENCOUNTER — ANCILLARY PROCEDURE (OUTPATIENT)
Dept: CT IMAGING | Facility: CLINIC | Age: 73
End: 2023-03-23
Attending: PHYSICIAN ASSISTANT
Payer: MEDICARE

## 2023-03-23 DIAGNOSIS — N20.1 URETERAL STONE: ICD-10-CM

## 2023-03-23 PROCEDURE — G1010 CDSM STANSON: HCPCS

## 2023-04-06 DIAGNOSIS — N20.1 CALCULUS OF URETER: Primary | ICD-10-CM

## 2023-04-07 ENCOUNTER — LAB (OUTPATIENT)
Dept: LAB | Facility: CLINIC | Age: 73
End: 2023-04-07
Payer: MEDICARE

## 2023-04-07 DIAGNOSIS — N20.1 CALCULUS OF URETER: ICD-10-CM

## 2023-04-07 PROCEDURE — 82365 CALCULUS SPECTROSCOPY: CPT | Mod: 90

## 2023-04-07 PROCEDURE — 99000 SPECIMEN HANDLING OFFICE-LAB: CPT

## 2023-04-12 LAB
APPEARANCE STONE: NORMAL
COMPN STONE: NORMAL
SPECIMEN WT: 110 MG

## 2023-07-19 ENCOUNTER — OFFICE VISIT (OUTPATIENT)
Dept: UROLOGY | Facility: CLINIC | Age: 73
End: 2023-07-19
Payer: MEDICARE

## 2023-07-19 VITALS
WEIGHT: 153 LBS | BODY MASS INDEX: 27.11 KG/M2 | HEART RATE: 106 BPM | HEIGHT: 63 IN | SYSTOLIC BLOOD PRESSURE: 158 MMHG | OXYGEN SATURATION: 99 % | DIASTOLIC BLOOD PRESSURE: 80 MMHG

## 2023-07-19 DIAGNOSIS — N20.1 CALCULUS OF URETER: ICD-10-CM

## 2023-07-19 DIAGNOSIS — N20.1 URETERAL STONE: Primary | ICD-10-CM

## 2023-07-19 DIAGNOSIS — R31.0 GROSS HEMATURIA: ICD-10-CM

## 2023-07-19 LAB
ALBUMIN UR-MCNC: NEGATIVE MG/DL
APPEARANCE UR: CLEAR
BILIRUB UR QL STRIP: NEGATIVE
COLOR UR AUTO: YELLOW
GLUCOSE UR STRIP-MCNC: NEGATIVE MG/DL
HGB UR QL STRIP: NEGATIVE
KETONES UR STRIP-MCNC: NEGATIVE MG/DL
LEUKOCYTE ESTERASE UR QL STRIP: ABNORMAL
NITRATE UR QL: NEGATIVE
PH UR STRIP: 6.5 [PH] (ref 5–7)
SP GR UR STRIP: 1.02 (ref 1–1.03)
UROBILINOGEN UR STRIP-ACNC: 0.2 E.U./DL

## 2023-07-19 PROCEDURE — 51798 US URINE CAPACITY MEASURE: CPT | Performed by: UROLOGY

## 2023-07-19 PROCEDURE — 52000 CYSTOURETHROSCOPY: CPT | Performed by: UROLOGY

## 2023-07-19 PROCEDURE — 99212 OFFICE O/P EST SF 10 MIN: CPT | Mod: 25 | Performed by: UROLOGY

## 2023-07-19 PROCEDURE — 81003 URINALYSIS AUTO W/O SCOPE: CPT | Mod: QW | Performed by: UROLOGY

## 2023-07-19 RX ORDER — LIDOCAINE HYDROCHLORIDE 20 MG/ML
JELLY TOPICAL ONCE
Status: COMPLETED | OUTPATIENT
Start: 2023-07-19 | End: 2023-07-19

## 2023-07-19 RX ADMIN — LIDOCAINE HYDROCHLORIDE 5 ML: 20 JELLY TOPICAL at 15:14

## 2023-07-19 ASSESSMENT — PAIN SCALES - GENERAL: PAINLEVEL: NO PAIN (0)

## 2023-07-19 NOTE — LETTER
"7/19/2023       RE: Schuyler Carl  [address]  [facility] [address]     Dear Colleague,    Thank you for referring your patient, Schuyler Carl, to the [facility] at [facility]. Please see a copy of my visit note below.    July 19, 2023    Return visit    Patient returns today for follow up.  She denies any changes in her health since last visit.    BP (!) 158/80   Pulse 106   Ht 1.6 m (5' 3\")   Wt 69.4 kg (153 lb)   SpO2 99%   BMI 27.10 kg/m    She is comfortable, in no distress, non-labored breathing.  Abdomen is soft, non-tender, non-distended.  Normal external female genitalia.  Negative CST.  Pelvic exam is unremarkable    Cystoscopy Note: After informed consent was obtained patient was prepped and draped in the standard fashion.  The flexible cystoscope was inserted into a normal appearing urethral meatus.  The urothelium was carefully examined and there were some trabeculations and some large mouth tics.  There were no tumors, masses, stones, foreign bodies, or other urothelial abnormalities noted.  Bilateral ureteral orifices were noted in the normal orthotopic position and both effluxed clear urine.  The cystoscope was retroflexed and the bladder neck was unremarkable.  The urethra was carefully examined upon removing the cystoscope and was unremarkable.  Patient tolerated the procedure without complications noted.      A/P: 72 year old F with history of gross hematuria, history of nephrolithiasis    No symptoms of her stones seen on last CT    Recommend follow up with Marline in about 6 months to reassess, sooner if needed    10 minutes were spent today on the date of the encounter in reviewing the EMR, direct patient care, coordination of care and documentation in addition to the cystoscopy procedure    Cornelia Feliz MD MPH  (she/her/hers)   of Urology  [facility]      CC  Patient Care " Team:  Bel Velasco MD as PCP - General (Family Medicine)  Marline Huber PA-C as Assigned Surgical Provider

## 2023-07-19 NOTE — NURSING NOTE
Chief Complaint   Patient presents with     Kidney Stone Related     Patient here today for cystosocpy       UA RESULTS:  Recent Labs   Lab Test 07/19/23  1419 02/22/23  1121   COLOR Yellow Yellow   APPEARANCE Clear Slightly Cloudy*   URINEGLC Negative 30*   URINEBILI Negative Negative   URINEKETONE Negative Trace*   SG 1.020 1.029   UBLD Negative Large*   URINEPH 6.5 5.5   PROTEIN Negative 50*   UROBILINOGEN 0.2  --    NITRITE Negative Negative   LEUKEST Trace* Negative   RBCU  --  >182*   WBCU  --  26*       Prior to the start of the procedure and with procedural staff participation, I verbally confirmed the patient s identity using two indicators, relevant allergies, that the procedure was appropriate and matched the consent or emergent situation, and that the correct equipment/implants were available I have wiped the patient off with the povidone-Iodine solution, draped them,  used Lidocaine hydrochloride jelly, and instilled sterile water into the bladder. (The Joint Commission universal protocol was followed.)  Yes    Sedation (Moderate or Deep): None    5mL 2% lidocaine hydrochloride Urojet instilled into urethra.    NDC# [address]-5  Lot #: [id number]  Expiration Date:  11/24        FREDY Acevedo

## 2023-07-19 NOTE — PATIENT INSTRUCTIONS
"Websites with free information:    [employer] patient website: www.voicesforpfd.org    Total Control Program: www.totalcontrolprogram.com    Please return to see Marline in 6 months, sooner if needed    It was a pleasure meeting with you today.  Thank you for allowing me and my team the privilege of caring for you today.  YOU are the reason we are here, and I truly hope we provided you with the excellent service you deserve.  Please let us know if there is anything else we can do for you so that we can be sure you are leaving completely satisfied with your care experience.                         AFTER YOUR CYSTOSCOPY  ?  ?  You have just completed a cystoscopy, or \"cysto\", which allowed your physician to learn more about your bladder (or to remove a stent placed after surgery). We suggest that you continue to avoid caffeine, fruit juice, and alcohol for the next 24 hours, however, you are encouraged to return to your normal activities.  ?  ?  A few things that are considered normal after your cystoscopy:  ?  * small amount of bleeding (or spotting) that clears within the next 24 hours  ?  * slight burning sensation with urination  ?  * sensation of needing to void (urinate) more frequently  ?  * the feeling of \"air\" in your urine  ?  * mild discomfort that is relieved with Tylenol    * bladder spasms  ?  ?  ?  Please contact our office promptly if you:  ?  * develop a fever above 101 degrees  ?  * are unable to urinate  ?  * develop bright red blood that does not stop  ?  * experience severe pain or swelling  ?  ?  ?  And of course, please contact our office with any concerns or questions [phone].    "

## 2023-07-19 NOTE — PROGRESS NOTES
"July 19, 2023    Return visit    Patient returns today for follow up.  She denies any changes in her health since last visit.    BP (!) 158/80   Pulse 106   Ht 1.6 m (5' 3\")   Wt 69.4 kg (153 lb)   SpO2 99%   BMI 27.10 kg/m    She is comfortable, in no distress, non-labored breathing.  Abdomen is soft, non-tender, non-distended.  Normal external female genitalia.  Negative CST.  Pelvic exam is unremarkable    Cystoscopy Note: After informed consent was obtained patient was prepped and draped in the standard fashion.  The flexible cystoscope was inserted into a normal appearing urethral meatus.  The urothelium was carefully examined and there were some trabeculations and some large mouth tics.  There were no tumors, masses, stones, foreign bodies, or other urothelial abnormalities noted.  Bilateral ureteral orifices were noted in the normal orthotopic position and both effluxed clear urine.  The cystoscope was retroflexed and the bladder neck was unremarkable.  The urethra was carefully examined upon removing the cystoscope and was unremarkable.  Patient tolerated the procedure without complications noted.      A/P: 72 year old F with history of gross hematuria, history of nephrolithiasis    No symptoms of her stones seen on last CT    Recommend follow up with Marline in about 6 months to reassess, sooner if needed    10 minutes were spent today on the date of the encounter in reviewing the EMR, direct patient care, coordination of care and documentation in addition to the cystoscopy procedure    Cornelia Feliz MD MPH  (she/her/hers)   of Urology  [facility]      CC  Patient Care Team:  Bel Velasco MD as PCP - General (Family Medicine)  Marline Huber PA-C as Assigned Surgical Provider                  "

## 2023-08-07 ENCOUNTER — ANCILLARY PROCEDURE (OUTPATIENT)
Dept: ULTRASOUND IMAGING | Facility: CLINIC | Age: 73
End: 2023-08-07
Attending: FAMILY MEDICINE
Payer: MEDICARE

## 2023-08-07 DIAGNOSIS — N94.89 PELVIC CONGESTION SYNDROME: ICD-10-CM

## 2023-08-07 PROCEDURE — 76830 TRANSVAGINAL US NON-OB: CPT

## 2023-08-08 ENCOUNTER — ANCILLARY PROCEDURE (OUTPATIENT)
Dept: ULTRASOUND IMAGING | Facility: CLINIC | Age: 73
End: 2023-08-08
Attending: FAMILY MEDICINE
Payer: MEDICARE

## 2023-08-08 DIAGNOSIS — K76.9 LIVER LESION: ICD-10-CM

## 2023-08-08 PROCEDURE — 76705 ECHO EXAM OF ABDOMEN: CPT

## 2023-10-30 ENCOUNTER — HOSPITAL ENCOUNTER (OUTPATIENT)
Dept: MAMMOGRAPHY | Facility: CLINIC | Age: 73
Discharge: HOME | End: 2023-10-30
Attending: FAMILY MEDICINE | Admitting: FAMILY MEDICINE
Payer: MEDICARE

## 2023-10-30 DIAGNOSIS — Z12.31 VISIT FOR SCREENING MAMMOGRAM: ICD-10-CM

## 2023-10-30 PROCEDURE — 77067 SCR MAMMO BI INCL CAD: CPT

## 2023-11-11 ENCOUNTER — HEALTH MAINTENANCE LETTER (OUTPATIENT)
Age: 73
End: 2023-11-11

## 2024-10-23 ENCOUNTER — ANCILLARY PROCEDURE (OUTPATIENT)
Dept: ULTRASOUND IMAGING | Facility: CLINIC | Age: 74
End: 2024-10-23
Attending: FAMILY MEDICINE
Payer: MEDICARE

## 2024-10-23 DIAGNOSIS — E27.8 MASS OF RIGHT ADRENAL GLAND (H): ICD-10-CM

## 2024-10-23 PROCEDURE — 76705 ECHO EXAM OF ABDOMEN: CPT

## 2024-11-01 ENCOUNTER — HOSPITAL ENCOUNTER (OUTPATIENT)
Dept: MAMMOGRAPHY | Facility: CLINIC | Age: 74
Discharge: HOME | End: 2024-11-01
Attending: FAMILY MEDICINE | Admitting: FAMILY MEDICINE
Payer: MEDICARE

## 2024-11-01 DIAGNOSIS — Z12.31 VISIT FOR SCREENING MAMMOGRAM: ICD-10-CM

## 2024-11-01 PROCEDURE — 77063 BREAST TOMOSYNTHESIS BI: CPT

## 2024-12-22 ENCOUNTER — HEALTH MAINTENANCE LETTER (OUTPATIENT)
Age: 74
End: 2024-12-22